# Patient Record
Sex: MALE | Race: WHITE
[De-identification: names, ages, dates, MRNs, and addresses within clinical notes are randomized per-mention and may not be internally consistent; named-entity substitution may affect disease eponyms.]

---

## 2017-06-12 ENCOUNTER — HOSPITAL ENCOUNTER (OUTPATIENT)
Dept: HOSPITAL 84 - D.CT | Age: 32
Discharge: HOME | End: 2017-06-12
Attending: FAMILY MEDICINE
Payer: COMMERCIAL

## 2017-06-12 VITALS — BODY MASS INDEX: 31.3 KG/M2

## 2017-06-12 DIAGNOSIS — R51: Primary | ICD-10-CM

## 2017-06-12 DIAGNOSIS — S00.93XA: ICD-10-CM

## 2017-06-12 DIAGNOSIS — R41.82: ICD-10-CM

## 2018-01-31 ENCOUNTER — HOSPITAL ENCOUNTER (EMERGENCY)
Dept: HOSPITAL 84 - D.ER | Age: 33
Discharge: HOME | End: 2018-01-31
Payer: SELF-PAY

## 2018-01-31 VITALS — BODY MASS INDEX: 31.3 KG/M2
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
## 2018-01-31 DIAGNOSIS — F17.200: ICD-10-CM
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
## 2018-01-31 DIAGNOSIS — S01.81XA: Primary | ICD-10-CM
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
## 2018-01-31 DIAGNOSIS — S01.01XA: ICD-10-CM

## 2018-01-31 DIAGNOSIS — S09.90XA: ICD-10-CM

## 2018-02-03 ENCOUNTER — HOSPITAL ENCOUNTER (EMERGENCY)
Dept: HOSPITAL 84 - D.ER | Age: 33
Discharge: HOME | End: 2018-02-03
Payer: SELF-PAY

## 2018-02-03 ENCOUNTER — HOSPITAL ENCOUNTER (EMERGENCY)
Dept: HOSPITAL 84 - D.ER | Age: 33
Discharge: LEFT BEFORE DISCHARGE | End: 2018-02-03
Payer: SELF-PAY

## 2018-02-03 VITALS — BODY MASS INDEX: 31.3 KG/M2

## 2018-02-03 DIAGNOSIS — S01.91XD: Primary | ICD-10-CM

## 2018-02-03 DIAGNOSIS — F17.200: ICD-10-CM
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
## 2018-02-03 DIAGNOSIS — L08.9: ICD-10-CM

## 2018-02-08 ENCOUNTER — HOSPITAL ENCOUNTER (EMERGENCY)
Dept: HOSPITAL 84 - D.ER | Age: 33
LOS: 1 days | Discharge: HOME | End: 2018-02-09
Payer: SELF-PAY

## 2018-02-08 VITALS — BODY MASS INDEX: 31.3 KG/M2

## 2018-02-08 DIAGNOSIS — R53.1: ICD-10-CM

## 2018-02-08 DIAGNOSIS — F17.200: ICD-10-CM

## 2018-02-08 DIAGNOSIS — J02.9: ICD-10-CM

## 2018-02-08 DIAGNOSIS — J11.1: Primary | ICD-10-CM

## 2018-02-08 DIAGNOSIS — R53.83: ICD-10-CM

## 2018-02-22 ENCOUNTER — HOSPITAL ENCOUNTER (EMERGENCY)
Dept: HOSPITAL 84 - D.ER | Age: 33
Discharge: HOME | End: 2018-02-22
Payer: SELF-PAY

## 2018-02-22 VITALS — BODY MASS INDEX: 31.3 KG/M2

## 2018-02-22 DIAGNOSIS — R10.9: Primary | ICD-10-CM

## 2018-02-22 LAB
ALBUMIN SERPL-MCNC: 3.3 G/DL (ref 3.4–5)
ALP SERPL-CCNC: 105 U/L (ref 46–116)
ALT SERPL-CCNC: 36 U/L (ref 10–68)
ANION GAP SERPL CALC-SCNC: 11.6 MMOL/L (ref 8–16)
APPEARANCE UR: CLEAR
BASOPHILS NFR BLD AUTO: 0.2 % (ref 0–2)
BILIRUB SERPL-MCNC: 0.72 MG/DL (ref 0.2–1.3)
BILIRUB SERPL-MCNC: NEGATIVE MG/DL
BUN SERPL-MCNC: 8 MG/DL (ref 7–18)
CALCIUM SERPL-MCNC: 8.7 MG/DL (ref 8.5–10.1)
CHLORIDE SERPL-SCNC: 106 MMOL/L (ref 98–107)
CO2 SERPL-SCNC: 27.2 MMOL/L (ref 21–32)
COLOR UR: (no result)
CREAT SERPL-MCNC: 1.1 MG/DL (ref 0.6–1.3)
EOSINOPHIL NFR BLD: 0.4 % (ref 0–7)
ERYTHROCYTE [DISTWIDTH] IN BLOOD BY AUTOMATED COUNT: 13 % (ref 11.5–14.5)
GLOBULIN SER-MCNC: 3.1 G/L
GLUCOSE SERPL-MCNC: 108 MG/DL (ref 74–106)
GLUCOSE SERPL-MCNC: NEGATIVE MG/DL
HCT VFR BLD CALC: 51.9 % (ref 42–54)
HGB BLD-MCNC: 18 G/DL (ref 13.5–17.5)
IMM GRANULOCYTES NFR BLD: 0.3 % (ref 0–5)
KETONES UR STRIP-MCNC: NEGATIVE MG/DL
LYMPHOCYTES NFR BLD AUTO: 24.9 % (ref 15–50)
MCH RBC QN AUTO: 30.6 PG (ref 26–34)
MCHC RBC AUTO-ENTMCNC: 34.7 G/DL (ref 31–37)
MCV RBC: 88.1 FL (ref 80–100)
MONOCYTES NFR BLD: 5.2 % (ref 2–11)
NEUTROPHILS NFR BLD AUTO: 69 % (ref 40–80)
NITRITE UR-MCNC: NEGATIVE MG/ML
OSMOLALITY SERPL CALC.SUM OF ELEC: 279 MOSM/KG (ref 275–300)
PH UR STRIP: 5 [PH] (ref 5–6)
PLATELET # BLD: 196 10X3/UL (ref 130–400)
PMV BLD AUTO: 11.3 FL (ref 7.4–10.4)
POTASSIUM SERPL-SCNC: 3.8 MMOL/L (ref 3.5–5.1)
PROT SERPL-MCNC: 6.4 G/DL (ref 6.4–8.2)
PROT UR-MCNC: NEGATIVE MG/DL
RBC # BLD AUTO: 5.89 10X6/UL (ref 4.2–6.1)
SODIUM SERPL-SCNC: 141 MMOL/L (ref 136–145)
SP GR UR STRIP: 1.02 (ref 1–1.02)
SQUAMOUS #/AREA URNS HPF: (no result) /HPF (ref 0–5)
UROBILINOGEN UR-MCNC: NORMAL MG/DL
WBC # BLD AUTO: 12.3 10X3/UL (ref 4.8–10.8)
WBC #/AREA URNS HPF: (no result) /HPF (ref 0–5)

## 2018-05-27 ENCOUNTER — HOSPITAL ENCOUNTER (EMERGENCY)
Dept: HOSPITAL 84 - D.ER | Age: 33
Discharge: HOME | End: 2018-05-27
Payer: MEDICAID

## 2018-05-27 VITALS — BODY MASS INDEX: 31.3 KG/M2

## 2018-05-27 DIAGNOSIS — F63.81: ICD-10-CM

## 2018-05-27 DIAGNOSIS — F32.9: Primary | ICD-10-CM

## 2018-05-27 LAB
ALBUMIN SERPL-MCNC: 3.3 G/DL (ref 3.4–5)
ALP SERPL-CCNC: 81 U/L (ref 46–116)
ALT SERPL-CCNC: 35 U/L (ref 10–68)
AMPHETAMINES UR QL SCN: NEGATIVE QUAL
ANION GAP SERPL CALC-SCNC: 12.7 MMOL/L (ref 8–16)
APAP SERPL-MCNC: 0 UG/ML (ref 10–30)
APPEARANCE UR: CLEAR
BARBITURATES UR QL SCN: NEGATIVE QUAL
BASOPHILS NFR BLD AUTO: 0.1 % (ref 0–2)
BENZODIAZ UR QL SCN: NEGATIVE QUAL
BILIRUB SERPL-MCNC: 0.71 MG/DL (ref 0.2–1.3)
BILIRUB SERPL-MCNC: NEGATIVE MG/DL
BUN SERPL-MCNC: 11 MG/DL (ref 7–18)
BZE UR QL SCN: NEGATIVE QUAL
CALCIUM SERPL-MCNC: 8.8 MG/DL (ref 8.5–10.1)
CANNABINOIDS UR QL SCN: POSITIVE QUAL
CHLORIDE SERPL-SCNC: 109 MMOL/L (ref 98–107)
CO2 SERPL-SCNC: 24.3 MMOL/L (ref 21–32)
COLOR UR: YELLOW
CREAT SERPL-MCNC: 1.2 MG/DL (ref 0.6–1.3)
EOSINOPHIL NFR BLD: 0.3 % (ref 0–7)
ERYTHROCYTE [DISTWIDTH] IN BLOOD BY AUTOMATED COUNT: 12.9 % (ref 11.5–14.5)
ETHANOL SERPL-MCNC: 0 MG/DL (ref 0–10)
GLOBULIN SER-MCNC: 3.1 G/L
GLUCOSE SERPL-MCNC: 108 MG/DL (ref 74–106)
GLUCOSE SERPL-MCNC: NEGATIVE MG/DL
HCT VFR BLD CALC: 46.2 % (ref 42–54)
HGB BLD-MCNC: 16 G/DL (ref 13.5–17.5)
IMM GRANULOCYTES NFR BLD: 0.3 % (ref 0–5)
KETONES UR STRIP-MCNC: (no result) MG/DL
LYMPHOCYTES NFR BLD AUTO: 21.5 % (ref 15–50)
MCH RBC QN AUTO: 30.3 PG (ref 26–34)
MCHC RBC AUTO-ENTMCNC: 34.6 G/DL (ref 31–37)
MCV RBC: 87.5 FL (ref 80–100)
MONOCYTES NFR BLD: 5.9 % (ref 2–11)
NEUTROPHILS NFR BLD AUTO: 71.9 % (ref 40–80)
NITRITE UR-MCNC: NEGATIVE MG/ML
OPIATES UR QL SCN: NEGATIVE QUAL
OSMOLALITY SERPL CALC.SUM OF ELEC: 282 MOSM/KG (ref 275–300)
PCP UR QL SCN: NEGATIVE QUAL
PH UR STRIP: 5 [PH] (ref 5–6)
PLATELET # BLD: 165 10X3/UL (ref 130–400)
PMV BLD AUTO: 11.2 FL (ref 7.4–10.4)
POTASSIUM SERPL-SCNC: 4 MMOL/L (ref 3.5–5.1)
PROT SERPL-MCNC: 6.4 G/DL (ref 6.4–8.2)
PROT UR-MCNC: NEGATIVE MG/DL
RBC # BLD AUTO: 5.28 10X6/UL (ref 4.2–6.1)
SODIUM SERPL-SCNC: 142 MMOL/L (ref 136–145)
SP GR UR STRIP: 1.02 (ref 1–1.02)
UROBILINOGEN UR-MCNC: NORMAL MG/DL
WBC # BLD AUTO: 15.2 10X3/UL (ref 4.8–10.8)

## 2018-06-21 ENCOUNTER — HOSPITAL ENCOUNTER (EMERGENCY)
Dept: HOSPITAL 84 - D.ER | Age: 33
Discharge: HOME | End: 2018-06-21
Payer: MEDICAID

## 2018-06-21 VITALS — HEIGHT: 73 IN

## 2018-06-21 VITALS — DIASTOLIC BLOOD PRESSURE: 68 MMHG | SYSTOLIC BLOOD PRESSURE: 122 MMHG

## 2018-06-21 DIAGNOSIS — R45.851: ICD-10-CM

## 2018-06-21 DIAGNOSIS — F32.9: Primary | ICD-10-CM

## 2018-06-21 DIAGNOSIS — F17.200: ICD-10-CM

## 2018-06-21 LAB
ALBUMIN SERPL-MCNC: 3.8 G/DL (ref 3.4–5)
ALP SERPL-CCNC: 113 U/L (ref 46–116)
ALT SERPL-CCNC: 36 U/L (ref 10–68)
AMPHETAMINES UR QL SCN: NEGATIVE QUAL
ANION GAP SERPL CALC-SCNC: 14.2 MMOL/L (ref 8–16)
APAP SERPL-MCNC: 0 UG/ML (ref 10–30)
APPEARANCE UR: CLEAR
BARBITURATES UR QL SCN: NEGATIVE QUAL
BASOPHILS NFR BLD AUTO: 0.2 % (ref 0–2)
BENZODIAZ UR QL SCN: NEGATIVE QUAL
BILIRUB SERPL-MCNC: 0.4 MG/DL (ref 0.2–1.3)
BILIRUB SERPL-MCNC: NEGATIVE MG/DL
BUN SERPL-MCNC: 12 MG/DL (ref 7–18)
BZE UR QL SCN: NEGATIVE QUAL
CALCIUM SERPL-MCNC: 9.7 MG/DL (ref 8.5–10.1)
CANNABINOIDS UR QL SCN: POSITIVE QUAL
CHLORIDE SERPL-SCNC: 108 MMOL/L (ref 98–107)
CO2 SERPL-SCNC: 24.1 MMOL/L (ref 21–32)
COLOR UR: (no result)
CREAT SERPL-MCNC: 1.2 MG/DL (ref 0.6–1.3)
EOSINOPHIL NFR BLD: 0.3 % (ref 0–7)
ERYTHROCYTE [DISTWIDTH] IN BLOOD BY AUTOMATED COUNT: 12.6 % (ref 11.5–14.5)
ETHANOL SERPL-MCNC: 1 MG/DL (ref 0–10)
GLOBULIN SER-MCNC: 3.1 G/L
GLUCOSE SERPL-MCNC: 107 MG/DL (ref 74–106)
GLUCOSE SERPL-MCNC: NEGATIVE MG/DL
HCT VFR BLD CALC: 48 % (ref 42–54)
HGB BLD-MCNC: 16.4 G/DL (ref 13.5–17.5)
IMM GRANULOCYTES NFR BLD: 0.9 % (ref 0–5)
KETONES UR STRIP-MCNC: NEGATIVE MG/DL
LYMPHOCYTES NFR BLD AUTO: 10.1 % (ref 15–50)
MCH RBC QN AUTO: 29.8 PG (ref 26–34)
MCHC RBC AUTO-ENTMCNC: 34.2 G/DL (ref 31–37)
MCV RBC: 87.3 FL (ref 80–100)
MONOCYTES NFR BLD: 1.5 % (ref 2–11)
NEUTROPHILS NFR BLD AUTO: 87 % (ref 40–80)
NITRITE UR-MCNC: NEGATIVE MG/ML
OPIATES UR QL SCN: NEGATIVE QUAL
OSMOLALITY SERPL CALC.SUM OF ELEC: 282 MOSM/KG (ref 275–300)
PCP UR QL SCN: NEGATIVE QUAL
PH UR STRIP: 5 [PH] (ref 5–6)
PLATELET # BLD: 211 10X3/UL (ref 130–400)
PMV BLD AUTO: 11.1 FL (ref 7.4–10.4)
POTASSIUM SERPL-SCNC: 4.3 MMOL/L (ref 3.5–5.1)
PROT SERPL-MCNC: 6.9 G/DL (ref 6.4–8.2)
PROT UR-MCNC: (no result) MG/DL
RBC # BLD AUTO: 5.5 10X6/UL (ref 4.2–6.1)
SODIUM SERPL-SCNC: 142 MMOL/L (ref 136–145)
SP GR UR STRIP: 1.02 (ref 1–1.02)
UROBILINOGEN UR-MCNC: NORMAL MG/DL
WBC # BLD AUTO: 13 10X3/UL (ref 4.8–10.8)

## 2019-01-22 ENCOUNTER — HOSPITAL ENCOUNTER (EMERGENCY)
Dept: HOSPITAL 84 - D.ER | Age: 34
Discharge: HOME | End: 2019-01-22
Payer: MEDICAID

## 2019-01-22 VITALS — DIASTOLIC BLOOD PRESSURE: 93 MMHG | SYSTOLIC BLOOD PRESSURE: 145 MMHG

## 2019-01-22 VITALS — WEIGHT: 270.57 LBS | HEIGHT: 73 IN | BODY MASS INDEX: 35.86 KG/M2
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
## 2019-01-22 DIAGNOSIS — F17.200: ICD-10-CM
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
## 2019-01-22 DIAGNOSIS — S00.01XA: Primary | ICD-10-CM

## 2019-08-11 ENCOUNTER — HOSPITAL ENCOUNTER (EMERGENCY)
Dept: HOSPITAL 84 - D.ER | Age: 34
Discharge: HOME | End: 2019-08-11
Payer: MEDICAID

## 2019-08-11 VITALS — BODY MASS INDEX: 35.86 KG/M2 | HEIGHT: 73 IN | WEIGHT: 270.57 LBS

## 2019-08-11 VITALS — SYSTOLIC BLOOD PRESSURE: 127 MMHG | DIASTOLIC BLOOD PRESSURE: 77 MMHG

## 2019-08-11 DIAGNOSIS — S00.81XA: ICD-10-CM

## 2019-08-11 DIAGNOSIS — S05.91XA: ICD-10-CM

## 2019-08-11 DIAGNOSIS — S60.222A: Primary | ICD-10-CM
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
## 2019-08-11 NOTE — NUR
PT ASSESSMENT FINDING REPORTED TO DR. PASTOR. DR. PASTOR STATES PT IS A LOW
RISK. NO FURTHER ORDERS AT THIS TIME. RESOURCES REVIEWED WITH PT AND HE
VERBALIZES UNDERSTANDING.